# Patient Record
Sex: MALE | Employment: FULL TIME | URBAN - METROPOLITAN AREA
[De-identification: names, ages, dates, MRNs, and addresses within clinical notes are randomized per-mention and may not be internally consistent; named-entity substitution may affect disease eponyms.]

---

## 2024-07-17 PROBLEM — L40.50 PSORIATIC ARTHRITIS: Status: ACTIVE | Noted: 2024-07-17

## 2024-08-02 ENCOUNTER — OFFICE VISIT (OUTPATIENT)
Age: 54
End: 2024-08-02
Payer: COMMERCIAL

## 2024-08-02 ENCOUNTER — TELEPHONE (OUTPATIENT)
Age: 54
End: 2024-08-02

## 2024-08-02 VITALS
TEMPERATURE: 97.2 F | HEART RATE: 81 BPM | SYSTOLIC BLOOD PRESSURE: 138 MMHG | HEIGHT: 75 IN | WEIGHT: 315 LBS | BODY MASS INDEX: 39.17 KG/M2 | DIASTOLIC BLOOD PRESSURE: 80 MMHG

## 2024-08-02 DIAGNOSIS — Z79.899 HIGH RISK MEDICATION USE: ICD-10-CM

## 2024-08-02 DIAGNOSIS — D84.821 IMMUNOSUPPRESSION DUE TO DRUG THERAPY: ICD-10-CM

## 2024-08-02 DIAGNOSIS — L40.50 PSORIATIC ARTHRITIS OF MULTIPLE JOINTS: Primary | ICD-10-CM

## 2024-08-02 DIAGNOSIS — M17.12 PRIMARY OSTEOARTHRITIS OF LEFT KNEE: ICD-10-CM

## 2024-08-02 DIAGNOSIS — L40.9 PSORIASIS: ICD-10-CM

## 2024-08-02 DIAGNOSIS — Z79.1 NSAID LONG-TERM USE: ICD-10-CM

## 2024-08-02 DIAGNOSIS — Z79.899 IMMUNOSUPPRESSION DUE TO DRUG THERAPY: ICD-10-CM

## 2024-08-02 DIAGNOSIS — M54.50 LOW BACK PAIN, UNSPECIFIED BACK PAIN LATERALITY, UNSPECIFIED CHRONICITY, UNSPECIFIED WHETHER SCIATICA PRESENT: ICD-10-CM

## 2024-08-02 PROCEDURE — 99214 OFFICE O/P EST MOD 30 MIN: CPT | Performed by: INTERNAL MEDICINE

## 2024-08-02 RX ORDER — RISANKIZUMAB-RZAA 150 MG/ML
1 INJECTION SUBCUTANEOUS
Qty: 1 ML | Refills: 1 | Status: SHIPPED | OUTPATIENT
Start: 2024-08-02

## 2024-08-02 RX ORDER — CHOLECALCIFEROL (VITAMIN D3) 1250 MCG
CAPSULE ORAL
COMMUNITY
Start: 2024-07-29

## 2024-08-02 RX ORDER — NABUMETONE 750 MG/1
750 TABLET, FILM COATED ORAL 2 TIMES DAILY PRN
Qty: 60 TABLET | Refills: 5 | Status: SHIPPED | OUTPATIENT
Start: 2024-08-02

## 2024-08-02 RX ORDER — CEFADROXIL 500 MG/1
500 CAPSULE ORAL 2 TIMES DAILY
COMMUNITY

## 2024-08-02 RX ORDER — RISANKIZUMAB-RZAA 150 MG/ML
1 INJECTION SUBCUTANEOUS
COMMUNITY
Start: 2024-07-10 | End: 2024-08-02 | Stop reason: SDUPTHER

## 2024-08-02 RX ORDER — LEFLUNOMIDE 10 MG/1
10 TABLET ORAL DAILY
COMMUNITY
End: 2024-08-02

## 2024-08-02 NOTE — PROGRESS NOTES
Office Follow Up      Date: 08/02/2024   Patient Name: Eddie Pool  MRN: [id number]  YOB: 1970    Referring Physician: No ref. provider found     Chief Complaint:   Chief Complaint   Patient presents with    Psoriatic Arthritis    Follow-up       History of Present Illness: Eddie Pool is a 53 y.o. male who is here today for follow up on psoriatic arthritis with psoriasis    He reports initially being diagnosed with psoriatic arthritis with plaque psoriasis around 2019 by [facility].  He reports chronic pain and swelling in his finger joints, toes for the past few years.  He frequently has tenderness over his epicondyles elbows and tenderness of his lumbar spine.  He has had dactylitis/sausage toes in the past.  He has severe plaque psoriasis involving his scalp, extensor surface elbows, hands.      He has previously tried sulfasalazine which he was allergic to, methotrexate which caused hair loss and Enbrel for about 3 years.  He then switched to Otezla which did not help him at all.  Enbrel historically was well tolerated but was only partially beneficial for his psoriasis and arthritis.      Interim 3/19/24: Overall improvement in psoriasis and peripheral joint pain/swelling on Skyrizi.  He switched biologic from Enbrel to Skyrizi starting 12/26/23. Joint pain is less on the Skyrizi and psoriasis is markedly improved.  No significant side effects to Skyrizi.  No serious infection.  Still aching in the lumbar spine is the major problem today.      Subjective       Review of Systems: Review of Systems   Constitutional:  Positive for fatigue. Negative for chills, fever and unexpected weight loss.   HENT:  Negative for mouth sores, sinus pressure and sore throat.         Dry mouth  Nose sores  Jaw pain  Hearing loss-maybe       Eyes:  Positive for redness. Negative for pain.        Dry eyes   Respiratory:  Positive for shortness of breath. Negative for  cough.    Cardiovascular:  Negative for chest pain.        Leg cramping  edema   Gastrointestinal:  Positive for diarrhea. Negative for abdominal pain, blood in stool, nausea, vomiting and GERD.        Bloating     Endocrine: Negative for polydipsia and polyuria.   Genitourinary:  Negative for dysuria, genital sores and hematuria.   Musculoskeletal:  Positive for arthralgias, back pain, joint swelling, neck pain and neck stiffness.        Joint tenderness  Morning stiffness  Muscle cramping   Skin:  Negative for rash and bruise.        Psoriasis  Photosensitvity  Malar rash   Allergic/Immunologic: Negative for immunocompromised state.   Neurological:  Positive for weakness and headache. Negative for seizures, numbness and memory problem.   Hematological:  Negative for adenopathy. Does not bruise/bleed easily.   Psychiatric/Behavioral:  Positive for sleep disturbance. Negative for depressed mood. The patient is not nervous/anxious.         Medications:   Current Outpatient Medications:     betamethasone dipropionate (DIPROLENE) 0.05 % ointment, Apply 1 Application topically to the appropriate area as directed., Disp: , Rfl:     buPROPion XL (WELLBUTRIN XL) 300 MG 24 hr tablet, Take 1 tablet by mouth Daily., Disp: , Rfl:     busPIRone (BUSPAR) 10 MG tablet, Take 1 tablet by mouth 2 (Two) Times a Day., Disp: , Rfl:     calcipotriene-betamethasone (Taclonex) 0.005-0.064 % ointment, Apply 1 Application topically to the appropriate area as directed., Disp: , Rfl:     carvedilol (COREG) 25 MG tablet, Take 1 tablet by mouth Daily. WITH FOOD, Disp: , Rfl:     cefadroxil (DURICEF) 500 MG capsule, Take 1 capsule by mouth 2 (Two) Times a Day., Disp: , Rfl:     Cholecalciferol (Vitamin D3) 1.25 MG (42498 UT) capsule, , Disp: , Rfl:     folic acid (FOLVITE) 1 MG tablet, Take 1 tablet by mouth Daily., Disp: , Rfl:     loratadine (CLARITIN) 10 MG tablet, Take 1 tablet by mouth Daily., Disp: , Rfl:     metFORMIN (GLUCOPHAGE) 500 MG  "tablet, Take 1 tablet by mouth 2 (Two) Times a Day With Meals., Disp: , Rfl:     Skyrizi Pen 150 MG/ML solution auto-injector, Inject 1 mL under the skin into the appropriate area as directed Every 3 (Three) Months., Disp: 1 mL, Rfl: 1    traMADol (ULTRAM) 50 MG tablet, Take 1 tablet by mouth., Disp: , Rfl:     vitamin B-12 (CYANOCOBALAMIN) 1000 MCG tablet, Take 1 tablet by mouth Daily., Disp: , Rfl:     nabumetone (RELAFEN) 750 MG tablet, Take 1 tablet by mouth 2 (Two) Times a Day As Needed for Mild Pain., Disp: 60 tablet, Rfl: 5    Allergies:   Allergies   Allergen Reactions    Sulfa Antibiotics Hives and Swelling       I have reviewed and updated the patient's chief complaint, history of present illness, review of systems, past medical history, surgical history, family history, social history, medications and allergy list as appropriate.     Objective        Vital Signs:   Vitals:    08/02/24 1133   BP: 138/80   BP Location: Left arm   Patient Position: Sitting   Cuff Size: Adult   Pulse: 81   Temp: 97.2 °F (36.2 °C)   Weight: (!) 179 kg (394 lb 11.2 oz)   Height: 190.5 cm (75\")   PainSc:   8   PainLoc: Back     Body mass index is 49.33 kg/m².      Physical Exam:  Physical Exam   MUSCULOSKELETAL:   No peripheral synovitis. No dactylitis.  No tender peripheral joints.  No enthesitis  Tender lumbar spine  Tender range of motion left knee with some crepitus.  No warmth or effusion.  Positive Baker cyst left knee.    Complete joint exam was performed including the MCPs, PIPs, DIPs of the hands, wrists, elbows, shoulders, hips, knees and ankles.  No soft tissue swelling or tenderness is present except as above.    General: Obese.  Cooperative, alert and oriented. Affect is normal. Hydration appears normal.   HEENT: Normocephalic and atraumatic. No notable alopecia. Lids and conjunctiva are normal. Pupils are equal and sclera are clear. Oropharynx is clear   NECK neck is supple without adenopathy, masses or " "thyromegaly.   CARDIOVASCULAR: Regular rate and rhythm. No murmurs, rubs or gallops   LUNGS: Effort is normal. Lungs are clear bilateral   ABDOMEN: Not examined  EXTREMITIES: Peripheral pulses are intact. No clubbing.   SKIN: No plaque psoriasis. No rashes. No subcutaneous nodules. No digital ulcers. No sclerodactyly.   NEUROLOGIC: Gait is normal. Strength testing is normal.  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    Skyrizi well tolerated and has been beneficial for both his joints and his psoriasis.   No side effects.  No serious infection.  Major problem today is chronic lumbar pain that I think may be more degenerative.  He also has some degenerative arthritis left knee    -X-ray lumbar spine today; PT ordered  -Labs reviewed from July 2024 on patient phone are stable.  Request hardcopy labs from PCP  -Switch NSAID from meloxicam " to nabumetone  Recommend continue Skyrizi every 12 weeks   Return to clinic 3 months  Psoriasis  Dermatology Dr. Champion.    -Minimal psoriasis now and markedly improved on Skyrizi  High risk medication use  Skyrizi  Q TB - 11/28/2023  Hepatitis panel - 11/28/2023    Well tolerated and effective     We discussed biologic agents at length. Risks and alternatives were discussed at length and the option of no treatment was also given. We discussed risks including but not limited to infections which can be unusual, severe, and deadly. When possible, these agents should be stopped immediately if infections occur. Unusual infection such as TB and fungal infections can occur. There may be an increased risk of lymphoma with these agents. Other risks can include a multiple sclerosis-like illness and worsening of heart failure. Infusion or injection reactions which can be deadly have been reported. Studies on pregnancy have not been done so pregnancy should be avoided while on these agents. Reactivation of a deadly brain virus and hepatitis viruses have been reported. Worsening of COPD has been seen with orencia. Elevated lipids, elevation in liver functions, and dangerous changes in blood counts have been seen with certain agents. Regular monitoring will be required.  Immunosuppression due to drug therapy    Low back pain, unspecified back pain laterality, unspecified chronicity, unspecified whether sciatica present  -As needed NSAID  -Physical therapy ordered  -Weight loss and Mediterranean diet would help  Primary osteoarthritis of left knee    NSAID long-term use  Risks of NSAIDs discussed including GI upset, GI bleeding, renal and hepatic risks and the risks of cardiovascular disease and stroke. Warned patient not to take with other NSAIDs including OTC NSAIDs.    Orders Placed This Encounter   Procedures    XR Spine Lumbar 2 or 3 View    Ambulatory Referral to Physical Therapy     New Medications Ordered This Visit    Medications    Skyrizi Pen 150 MG/ML solution auto-injector     Sig: Inject 1 mL under the skin into the appropriate area as directed Every 3 (Three) Months.     Dispense:  1 mL     Refill:  1    nabumetone (RELAFEN) 750 MG tablet     Sig: Take 1 tablet by mouth 2 (Two) Times a Day As Needed for Mild Pain.     Dispense:  60 tablet     Refill:  5           Follow Up:   Return in about 4 months (around 12/2/2024).        Shaji Singh MD  [facility]

## 2024-08-02 NOTE — ASSESSMENT & PLAN NOTE
Jez STARKS TB - 11/28/2023  Hepatitis panel - 11/28/2023    Well tolerated and effective     We discussed biologic agents at length. Risks and alternatives were discussed at length and the option of no treatment was also given. We discussed risks including but not limited to infections which can be unusual, severe, and deadly. When possible, these agents should be stopped immediately if infections occur. Unusual infection such as TB and fungal infections can occur. There may be an increased risk of lymphoma with these agents. Other risks can include a multiple sclerosis-like illness and worsening of heart failure. Infusion or injection reactions which can be deadly have been reported. Studies on pregnancy have not been done so pregnancy should be avoided while on these agents. Reactivation of a deadly brain virus and hepatitis viruses have been reported. Worsening of COPD has been seen with orencia. Elevated lipids, elevation in liver functions, and dangerous changes in blood counts have been seen with certain agents. Regular monitoring will be required.

## 2024-08-02 NOTE — ASSESSMENT & PLAN NOTE
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    Skyrizi well tolerated and has been beneficial for both his joints and his psoriasis.   No side effects.  No serious infection.  Major problem today is chronic lumbar pain that I think may be more degenerative.  He also has some degenerative arthritis left knee    -X-ray lumbar spine today; PT ordered  -Labs reviewed from July 2024 on patient phone are stable.  Request hardcopy labs from PCP  -Switch NSAID from meloxicam to nabumetone  Recommend continue Skyrizi every 12 weeks   Return to clinic 3 months

## 2024-08-07 ENCOUNTER — SPECIALTY PHARMACY (OUTPATIENT)
Age: 54
End: 2024-08-07
Payer: COMMERCIAL

## 2024-11-20 ENCOUNTER — TELEPHONE (OUTPATIENT)
Age: 54
End: 2024-11-20
Payer: COMMERCIAL

## 2024-11-20 ENCOUNTER — SPECIALTY PHARMACY (OUTPATIENT)
Age: 54
End: 2024-11-20
Payer: COMMERCIAL

## 2024-11-20 NOTE — TELEPHONE ENCOUNTER
File Link    Scan on 11/20/2024 0510 by [employer], [place]: [employer],SKYRIZI 150 MG/ML        Key Information    Document ID File Type Document Type Description   [id number] Image REFERRAL/PRE-AUTH CSN - SCAN [employer],SKYRIZI 150 MG/ML     Import Information    Attached At Date Time User Dept   Encounter Level 11/20/2024  5:10 AM [employer], [place]      Encounter    Scanned Document on 11/20/24 with [employer], [place]

## 2025-01-17 ENCOUNTER — OFFICE VISIT (OUTPATIENT)
Age: 55
End: 2025-01-17
Payer: COMMERCIAL

## 2025-01-17 ENCOUNTER — TELEPHONE (OUTPATIENT)
Age: 55
End: 2025-01-17

## 2025-01-17 VITALS
HEART RATE: 99 BPM | SYSTOLIC BLOOD PRESSURE: 130 MMHG | DIASTOLIC BLOOD PRESSURE: 78 MMHG | WEIGHT: 315 LBS | BODY MASS INDEX: 39.17 KG/M2 | TEMPERATURE: 97.3 F | HEIGHT: 75 IN

## 2025-01-17 DIAGNOSIS — L40.50 PSORIATIC ARTHRITIS OF MULTIPLE JOINTS: Primary | ICD-10-CM

## 2025-01-17 DIAGNOSIS — Z79.899 HIGH RISK MEDICATION USE: ICD-10-CM

## 2025-01-17 NOTE — PROGRESS NOTES
Office Follow Up      Date: 01/17/2025   Patient Name: Eddie Pool  MRN: [id number]  YOB: 1970    Referring Physician: Diego Herrera, *     Chief Complaint:   Chief Complaint   Patient presents with    Psoriatic Arthritis       History of Present Illness: Eddie Pool is a 54 y.o. male who is here today for follow up on psoriatic arthritis with psoriasis    History:  He reports initially being diagnosed with psoriatic arthritis with plaque psoriasis around 2019 by [facility].  He reports chronic pain and swelling in his finger joints, toes for the past few years.  He frequently has tenderness over his epicondyles elbows and tenderness of his lumbar spine.  He has had dactylitis/sausage toes in the past.  He has severe plaque psoriasis involving his scalp, extensor surface elbows, hands.      He has previously tried sulfasalazine which he was allergic to, methotrexate which caused hair loss and Enbrel for about 3 years.  He then switched to Otezla which did not help him at all.  Enbrel historically was well tolerated but was only partially beneficial for his psoriasis and arthritis.      Interim 1/17/2025: Overall improvement in psoriasis and peripheral joint pain/swelling on Skyrizi.  He switched biologic from Enbrel to Skyrizi starting 12/26/23. Joint pain is less on the Skyrizi and psoriasis is markedly improved.  No significant side effects to Skyrizi.  No serious infection.  His insurance switched the first of the year and he thinks Skyrizi may not be covered with a new insurance      Subjective       Review of Systems: Review of Systems   Constitutional:  Negative for chills, fatigue, fever and unexpected weight loss.   HENT:  Positive for congestion and sinus pressure. Negative for mouth sores and sore throat.    Eyes:  Negative for pain and redness.   Respiratory:  Positive for cough. Negative for shortness of breath.    Cardiovascular:   Negative for chest pain.   Gastrointestinal:  Positive for diarrhea, GERD and indigestion. Negative for abdominal pain, blood in stool, nausea and vomiting.   Endocrine: Negative for polydipsia and polyuria.   Genitourinary:  Positive for flank pain. Negative for dysuria, genital sores and hematuria.   Musculoskeletal:  Positive for arthralgias, back pain, joint swelling, myalgias and neck pain. Negative for neck stiffness.   Skin:  Positive for dry skin. Negative for rash and bruise.   Allergic/Immunologic: Negative for immunocompromised state.   Neurological:  Negative for seizures, weakness, numbness and memory problem.   Hematological:  Negative for adenopathy. Does not bruise/bleed easily.   Psychiatric/Behavioral:  Positive for sleep disturbance. Negative for depressed mood. The patient is not nervous/anxious.         Medications:   Current Outpatient Medications:     buPROPion XL (WELLBUTRIN XL) 300 MG 24 hr tablet, Take 1 tablet by mouth Daily., Disp: , Rfl:     busPIRone (BUSPAR) 10 MG tablet, Take 1 tablet by mouth 2 (Two) Times a Day., Disp: , Rfl:     carvedilol (COREG) 25 MG tablet, Take 1 tablet by mouth Daily. WITH FOOD, Disp: , Rfl:     Cholecalciferol (Vitamin D3) 1.25 MG (13951 UT) capsule, , Disp: , Rfl:     folic acid (FOLVITE) 1 MG tablet, Take 1 tablet by mouth Daily., Disp: , Rfl:     loratadine (CLARITIN) 10 MG tablet, Take 1 tablet by mouth Daily., Disp: , Rfl:     metFORMIN (GLUCOPHAGE) 500 MG tablet, Take 1 tablet by mouth 2 (Two) Times a Day With Meals., Disp: , Rfl:     nabumetone (RELAFEN) 750 MG tablet, Take 1 tablet by mouth 2 (Two) Times a Day As Needed for Mild Pain., Disp: 60 tablet, Rfl: 5    Skyrizi Pen 150 MG/ML solution auto-injector, Inject 1 mL under the skin into the appropriate area as directed Every 3 (Three) Months., Disp: 1 mL, Rfl: 1    traMADol (ULTRAM) 50 MG tablet, Take 1 tablet by mouth., Disp: , Rfl:     vitamin B-12 (CYANOCOBALAMIN) 1000 MCG tablet, Take 1 tablet  "by mouth Daily., Disp: , Rfl:     Allergies:   Allergies   Allergen Reactions    Sulfa Antibiotics Hives and Swelling       I have reviewed and updated the patient's chief complaint, history of present illness, review of systems, past medical history, surgical history, family history, social history, medications and allergy list, physical exam, assessment and plan as appropriate.     Objective        Vital Signs:   Vitals:    01/17/25 0851   BP: 130/78   BP Location: Right arm   Patient Position: Sitting   Cuff Size: Large Adult   Pulse: 99   Temp: 97.3 °F (36.3 °C)   Weight: (!) 185 kg (408 lb 3.2 oz)   Height: 190.5 cm (75\")   PainSc:   7       Body mass index is 51.02 kg/m².      Physical Exam:  Physical Exam   MUSCULOSKELETAL:   No peripheral synovitis. No dactylitis.  No tender peripheral joints.  No enthesitis  Tender lumbar spine  Tender range of motion left knee with some crepitus.  No warmth or effusion.  Positive Baker cyst left knee.    Complete joint exam was performed including the MCPs, PIPs, DIPs of the hands, wrists, elbows, shoulders, hips, knees and ankles.  No soft tissue swelling or tenderness is present except as above.    General: Obese.  Cooperative, alert and oriented. Affect is normal. Hydration appears normal.   HEENT: Normocephalic and atraumatic. No notable alopecia. Lids and conjunctiva are normal. Pupils are equal and sclera are clear. Oropharynx is clear   NECK neck is supple without adenopathy, masses or thyromegaly.   CARDIOVASCULAR: Regular rate and rhythm. No murmurs, rubs or gallops   LUNGS: Effort is normal. Lungs are clear bilateral   ABDOMEN: Not examined  EXTREMITIES: Peripheral pulses are intact. No clubbing.   SKIN: No plaque psoriasis. No rashes. No subcutaneous nodules. No digital ulcers. No sclerodactyly.   NEUROLOGIC: Gait is normal. Strength testing is normal.  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  Swollen joint count 0.  Skyrizi well tolerated and has been beneficial for both his joints and his psoriasis.   No side effects.  No serious infection.  Some chronic lumbar pain likely due to degenerative arthritis.  He also has some degenerative arthritis left knee     -Labs reviewed from July 2024 are stable.  Request hardcopy more recent labs done with PCP  -Labs ordered as below including QuantiFERON for monitoring on biologic therapy  -Continue as needed nabumetone  -continue Skyrizi every 12 weeks and we will try to get this approved with his new insurance.  He is due for his next dose March 2025  Return to clinic 6 months    Psoriasis  Dermatology Dr. Champion.     -Minimal psoriasis and markedly improved on Skyrizi    High risk medication use  Skyrizi  Q TB - 11/28/2023 updated 1/17/2025  Hepatitis panel - 11/28/2023     Well tolerated and effective      We discussed biologic agents at length. Risks and alternatives were discussed at length and the option of no treatment was also given. We discussed risks including but not limited to " infections which can be unusual, severe, and deadly. When possible, these agents should be stopped immediately if infections occur. Unusual infection such as TB and fungal infections can occur. There may be an increased risk of lymphoma with these agents. Other risks can include a multiple sclerosis-like illness and worsening of heart failure. Infusion or injection reactions which can be deadly have been reported. Studies on pregnancy have not been done so pregnancy should be avoided while on these agents. Reactivation of a deadly brain virus and hepatitis viruses have been reported. Worsening of COPD has been seen with orencia. Elevated lipids, elevation in liver functions, and dangerous changes in blood counts have been seen with certain agents. Regular monitoring will be required.    Immunosuppression due to drug therapy      Low back pain  -As needed NSAID  -Physical therapy ordered  -Weight loss and Mediterranean diet would help    Primary osteoarthritis of left knee     NSAID long-term use  Risks of NSAIDs discussed including GI upset, GI bleeding, renal and hepatic risks and the risks of cardiovascular disease and stroke. Warned patient not to take with other NSAIDs including OTC NSAIDs    Assessment & Plan  Psoriatic arthritis of multiple joints    High risk medication use      Orders Placed This Encounter   Procedures    CBC Auto Differential    Comprehensive Metabolic Panel    C-reactive Protein    Sedimentation Rate    QuantiFERON-TB Gold Plus       New Medications Ordered This Visit   Medications    nabumetone (RELAFEN) 750 MG tablet     Sig: Take 1 tablet by mouth 2 (Two) Times a Day As Needed for Mild Pain.     Dispense:  60 tablet     Refill:  5             Follow Up:   Return in about 6 months (around 7/17/2025).        Shaji Singh MD  [facility] [place] [place]

## 2025-01-17 NOTE — TELEPHONE ENCOUNTER
I spoke with April at PCP's office.  Pt hasn't had labs since July 2024, but she will go ahead and fax those to our office. -Destinee Baca, SUMAYA

## 2025-01-17 NOTE — TELEPHONE ENCOUNTER
Pt with new insurance 1/25 and reports he thinks Skyrizi not covered with new insurnace.    Prior auth continued Skyrizi for psoriatic arthritis as it works well for his disease.  Pt has failed multiple biologic previously.  See my progress note for details

## 2025-02-24 DIAGNOSIS — L40.50 PSORIATIC ARTHRITIS OF MULTIPLE JOINTS: ICD-10-CM

## 2025-02-24 DIAGNOSIS — L40.9 PSORIASIS: ICD-10-CM

## 2025-02-26 RX ORDER — RISANKIZUMAB-RZAA 150 MG/ML
INJECTION SUBCUTANEOUS
Qty: 1 ML | Refills: 1 | OUTPATIENT
Start: 2025-02-26

## 2025-03-10 ENCOUNTER — SPECIALTY PHARMACY (OUTPATIENT)
Facility: HOSPITAL | Age: 55
End: 2025-03-10
Payer: COMMERCIAL

## 2025-03-14 ENCOUNTER — SPECIALTY PHARMACY (OUTPATIENT)
Age: 55
End: 2025-03-14
Payer: COMMERCIAL

## 2025-04-24 ENCOUNTER — TELEPHONE (OUTPATIENT)
Age: 55
End: 2025-04-24
Payer: COMMERCIAL

## 2025-07-14 ENCOUNTER — TELEPHONE (OUTPATIENT)
Age: 55
End: 2025-07-14
Payer: COMMERCIAL

## 2025-07-14 NOTE — TELEPHONE ENCOUNTER
Spoke to wife... [facility].. told her I would have Stephanie call her 07/15/25

## 2025-07-18 ENCOUNTER — OFFICE VISIT (OUTPATIENT)
Age: 55
End: 2025-07-18
Payer: COMMERCIAL

## 2025-07-18 ENCOUNTER — TELEPHONE (OUTPATIENT)
Age: 55
End: 2025-07-18

## 2025-07-18 VITALS
DIASTOLIC BLOOD PRESSURE: 84 MMHG | TEMPERATURE: 97.3 F | SYSTOLIC BLOOD PRESSURE: 124 MMHG | WEIGHT: 315 LBS | BODY MASS INDEX: 39.17 KG/M2 | HEIGHT: 75 IN | HEART RATE: 99 BPM

## 2025-07-18 DIAGNOSIS — L40.50 PSORIATIC ARTHRITIS OF MULTIPLE JOINTS: Primary | ICD-10-CM

## 2025-07-18 DIAGNOSIS — Z79.899 IMMUNOSUPPRESSION DUE TO DRUG THERAPY: ICD-10-CM

## 2025-07-18 DIAGNOSIS — L40.9 PSORIASIS: ICD-10-CM

## 2025-07-18 DIAGNOSIS — Z79.899 HIGH RISK MEDICATION USE: ICD-10-CM

## 2025-07-18 DIAGNOSIS — D84.821 IMMUNOSUPPRESSION DUE TO DRUG THERAPY: ICD-10-CM

## 2025-07-18 PROCEDURE — 99214 OFFICE O/P EST MOD 30 MIN: CPT | Performed by: INTERNAL MEDICINE

## 2025-07-18 RX ORDER — RISANKIZUMAB-RZAA 150 MG/ML
1 INJECTION SUBCUTANEOUS
Qty: 1 ML | Refills: 1 | Status: SHIPPED | OUTPATIENT
Start: 2025-07-18

## 2025-07-18 NOTE — TELEPHONE ENCOUNTER
According to notes January 2025 from pharmacist, his new insurance does not cover any biologic medication.  Is this indeed the case?  Please continue to work on appealing Skyrizi to his insurance to see if they might possibly get it covered.  He reports he was told he did not qualify for [employer] assistance for Skyrizi.  He has been out of Skyrizi since January 2025 when he got his new insurance.    I gave him 2 samples of Skyrizi today to last for the next 6 months while we work on this.  Thank you

## 2025-07-18 NOTE — TELEPHONE ENCOUNTER
We submitted an appeal with the patients insurance company at the end of May, it can take up to 60 days to process, we are waiting on a determination.     [employer] is denying patient assistance for the patient until the appeal is completed.

## 2025-07-18 NOTE — PROGRESS NOTES
Office Follow Up      Date: 07/18/2025   Patient Name: Eddie Pool  MRN: [id number]  YOB: 1970    Referring Physician: No ref. provider found     Chief Complaint:   Chief Complaint   Patient presents with    Psoriatic Arthritis       History of Present Illness: Eddie Pool is a 54 y.o. male who is here today for follow up on psoriatic arthritis with psoriasis     History:  He reports initially being diagnosed with psoriatic arthritis with plaque psoriasis around 2019 by [facility] rheumatology.  He reports chronic pain and swelling in his finger joints, toes for the past few years.  He frequently has tenderness over his epicondyles elbows and tenderness of his lumbar spine.  He has had dactylitis/sausage toes in the past.  He has severe plaque psoriasis involving his scalp, extensor surface elbows, hands.       He has previously tried sulfasalazine which he was allergic to, methotrexate which caused hair loss and Enbrel for about 3 years.  He then switched to Otezla which did not help him at all.  Enbrel historically was well tolerated but was only partially beneficial for his psoriasis and arthritis.      He switch from Enbrel to Skyrizi starting 12/26/2023.     Interim 7/18/2025: Overall improvement in psoriasis and peripheral joint pain/swelling on Skyrizi. Joint pain is less on the Skyrizi and psoriasis is improved.  No significant side effects to Skyrizi.  No serious infection.      Unfortunately he has been out of Skyrizi since January 2025.  His new insurance reportedly does not cover Skyrizi and he reports he does not qualify for [employer] assistance for Skyrizi.  Skyrizi was really working well for him and he would like to stay on it.  He notes increasing back pain and recurrence of psoriasis in his scalp and behind the ears    History of Present Illness        Subjective       Review of Systems: Review of Systems   Constitutional:  Positive for activity  change and fatigue. Negative for chills, fever and unexpected weight loss.   HENT:  Negative for mouth sores, sinus pressure and sore throat.    Eyes:  Negative for pain and redness.   Respiratory:  Negative for cough and shortness of breath.    Cardiovascular:  Positive for leg swelling. Negative for chest pain.   Gastrointestinal:  Positive for anal bleeding, blood in stool and indigestion. Negative for abdominal pain, diarrhea, nausea, vomiting and GERD.   Endocrine: Negative for polydipsia and polyuria.   Genitourinary:  Negative for dysuria, genital sores and hematuria.   Musculoskeletal:  Positive for arthralgias, back pain, joint swelling and neck pain. Negative for myalgias and neck stiffness.   Skin:  Negative for rash and bruise.   Neurological:  Negative for seizures, weakness, numbness and memory problem.   Hematological:  Negative for adenopathy. Does not bruise/bleed easily.   Psychiatric/Behavioral:  Negative for depressed mood. The patient is not nervous/anxious.         Past Medical History:   Past Medical History:   Diagnosis Date    Condition not found     HEART IS ON THE RIGHT SIDE    GERD (gastroesophageal reflux disease)     Headache, migraine     Hypertension     Obesity     Psoriasis     Psoriatic arthritis        Past Surgical History:   Past Surgical History:   Procedure Laterality Date    CHOLECYSTECTOMY         Family History:   Family History   Problem Relation Age of Onset    Heart disease Mother     Heart attack Mother     Fibromyalgia Mother     Lung cancer Father     Fibromyalgia Sister     Arthritis Sister     Crohn's disease Sister     Diabetes Brother     Post-traumatic stress disorder Daughter        Social History:   Social History     Socioeconomic History    Marital status:    Tobacco Use    Smoking status: Never   Vaping Use    Vaping status: Never Used   Substance and Sexual Activity    Alcohol use: Never     Comment: NO    Drug use: Never    Sexual activity: Defer  "      Medications:   Current Outpatient Medications:     buPROPion XL (WELLBUTRIN XL) 300 MG 24 hr tablet, Take 1 tablet by mouth Daily., Disp: , Rfl:     busPIRone (BUSPAR) 10 MG tablet, Take 1 tablet by mouth 2 (Two) Times a Day., Disp: , Rfl:     carvedilol (COREG) 25 MG tablet, Take 1 tablet by mouth Daily. WITH FOOD, Disp: , Rfl:     Cholecalciferol (Vitamin D3) 1.25 MG (72071 UT) capsule, , Disp: , Rfl:     loratadine (CLARITIN) 10 MG tablet, Take 1 tablet by mouth Daily., Disp: , Rfl:     metFORMIN (GLUCOPHAGE) 500 MG tablet, Take 1 tablet by mouth 2 (Two) Times a Day With Meals., Disp: , Rfl:     nabumetone (RELAFEN) 750 MG tablet, Take 1 tablet by mouth 2 (Two) Times a Day As Needed for Mild Pain., Disp: 60 tablet, Rfl: 5    Skyrizi Pen 150 MG/ML solution auto-injector, Inject 1 mL under the skin into the appropriate area as directed Every 3 (Three) Months., Disp: 1 mL, Rfl: 1    traMADol (ULTRAM) 50 MG tablet, Take 1 tablet by mouth., Disp: , Rfl:     vitamin B-12 (CYANOCOBALAMIN) 1000 MCG tablet, Take 1 tablet by mouth Daily., Disp: , Rfl:     Allergies:   Allergies   Allergen Reactions    Sulfa Antibiotics Hives and Swelling           Objective        Vital Signs:   Vitals:    07/18/25 0954   BP: 124/84   BP Location: Left arm   Patient Position: Sitting   Cuff Size: Large Adult   Pulse: 99   Temp: 97.3 °F (36.3 °C)   Weight: (!) 176 kg (388 lb 9.6 oz)   Height: 190.5 cm (75\")   PainSc: 8      Body mass index is 48.57 kg/m².      Physical Exam:  Physical Exam   MUSCULOSKELETAL:   No peripheral synovitis  No dactylitis.  No tender peripheral joints.  No enthesitis  Tender lumbar spine  Tender range of motion left knee with some crepitus.  No warmth or effusion.     Complete joint exam was performed including the MCPs, PIPs, DIPs of the hands, wrists, elbows, shoulders, hips, knees and ankles.  No soft tissue swelling or tenderness is present except as above.    General: The patient is well-developed and " "well nourished. Cooperative, alert and oriented. Affect is normal. Hydration appears normal.   HEENT: Normocephalic and atraumatic. Lids and conjunctiva are normal. Pupils are equal and sclera are clear. Oropharynx is clear   NECK neck is supple without adenopathy, masses or thyromegaly.   CARDIOVASCULAR: Regular rate and rhythm. No murmurs, rubs or gallops   LUNGS: Effort is normal. Lungs are clear bilateral   ABDOMEN: Not examined  EXTREMITIES: Peripheral pulses are intact. No clubbing.   SKIN: Positive psoriasiform plaques behind the ears and hairline.  No plaques extremities.    No subcutaneous nodules. No digital ulcers. No sclerodactyly.   NEUROLOGIC: Gait is normal. Strength testing is normal.  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  Swollen joint count 0.  Skyrizi well tolerated and has been beneficial for both his joints and his psoriasis.   No side effects.  No serious infection.  Some chronic lumbar pain likely due to degenerative arthritis.  He also has " some degenerative arthritis left knee     -Unfortunately he has been out of Skyrizi since January 2025.  His new insurance reportedly does not cover Skyrizi or any biologic therapy, and he reports he does not qualify for [employer] assistance for Skyrizi.  Skyrizi was really working well for him and he would like to stay on it.    He notes increasing back pain and recurrence of psoriasis in his scalp and behind the ears without the Skyrizi    -2 Skyrizi 150 mg samples provided today from our sample closet last for the next 6 months  -Continue to appeal the denial of Skyrizi with his insurance.  -Labs ordered as below including QuantiFERON for monitoring on biologic therapy  -Continue as needed nabumetone.  Refilled  Return to clinic 6 months     Psoriasis  Dermatology Dr. Champion.     -Minimal psoriasis and markedly improved on Skyrizi     High risk medication use  Immunosuppression due to drug therapy  Skyrizi  Q TB - 11/28/2023 updated 7/18/2025  Hepatitis panel - 11/28/2023     Well tolerated and effective      We discussed biologic agents at length. Risks and alternatives were discussed at length and the option of no treatment was also given. We discussed risks including but not limited to infections which can be unusual, severe, and deadly. When possible, these agents should be stopped immediately if infections occur. Unusual infection such as TB and fungal infections can occur. There may be an increased risk of lymphoma with these agents. Other risks can include a multiple sclerosis-like illness and worsening of heart failure. Infusion or injection reactions which can be deadly have been reported. Studies on pregnancy have not been done so pregnancy should be avoided while on these agents. Reactivation of a deadly brain virus and hepatitis viruses have been reported. Worsening of COPD has been seen with orencia. Elevated lipids, elevation in liver functions, and dangerous changes in blood counts have been seen with  certain agents. Regular monitoring will be required.        Low back pain  Primary osteoarthritis of left knee  -As needed NSAID  -Physical therapy ordered  -Weight loss and Mediterranean diet would help        NSAID long-term use  Risks of NSAIDs discussed including GI upset, GI bleeding, renal and hepatic risks and the risks of cardiovascular disease and stroke. Warned patient not to take with other NSAIDs including OTC NSAIDs      1. Psoriatic arthritis of multiple joints    2. High risk medication use    3. Immunosuppression due to drug therapy    4. Psoriasis        Assessment & Plan        Orders Placed This Encounter   Procedures    CBC Auto Differential    Comprehensive Metabolic Panel    C-reactive Protein    Sedimentation Rate    QuantiFERON-TB Gold Plus (Li-Hep)     New Medications Ordered This Visit   Medications    nabumetone (RELAFEN) 750 MG tablet     Sig: Take 1 tablet by mouth 2 (Two) Times a Day As Needed for Mild Pain.     Dispense:  60 tablet     Refill:  5    Skyrizi Pen 150 MG/ML solution auto-injector     Sig: Inject 1 mL under the skin into the appropriate area as directed Every 3 (Three) Months.     Dispense:  1 mL     Refill:  1       Follow Up:   Return in about 6 months (around 1/18/2026).      Discussed plan of care in detail with the patient and his wife today.  Patient verbalized understanding and agrees.    I confirm accuracy of unchanged data/findings which have been carried forward from previous visit.  I have updated appropriately those that have changed.        Shaji Singh MD  [facility] [place]

## 2025-07-21 ENCOUNTER — SPECIALTY PHARMACY (OUTPATIENT)
Age: 55
End: 2025-07-21
Payer: COMMERCIAL

## 2025-07-22 LAB
ALBUMIN SERPL-MCNC: 4.4 G/DL (ref 3.5–5.2)
ALBUMIN/GLOB SERPL: 1.6 G/DL
ALP SERPL-CCNC: 75 U/L (ref 39–117)
ALT SERPL-CCNC: 42 U/L (ref 1–41)
AST SERPL-CCNC: 27 U/L (ref 1–40)
BASOPHILS # BLD AUTO: 0.04 10*3/MM3 (ref 0–0.2)
BASOPHILS NFR BLD AUTO: 0.4 % (ref 0–1.5)
BILIRUB SERPL-MCNC: 0.5 MG/DL (ref 0–1.2)
BUN SERPL-MCNC: 13 MG/DL (ref 6–20)
BUN/CREAT SERPL: 12.5 (ref 7–25)
CALCIUM SERPL-MCNC: 9.3 MG/DL (ref 8.6–10.5)
CHLORIDE SERPL-SCNC: 103 MMOL/L (ref 98–107)
CO2 SERPL-SCNC: 27.7 MMOL/L (ref 22–29)
CREAT SERPL-MCNC: 1.04 MG/DL (ref 0.76–1.27)
CRP SERPL-MCNC: 0.49 MG/DL (ref 0–0.5)
EGFRCR SERPLBLD CKD-EPI 2021: 85.3 ML/MIN/1.73
EOSINOPHIL # BLD AUTO: 0.19 10*3/MM3 (ref 0–0.4)
EOSINOPHIL NFR BLD AUTO: 2 % (ref 0.3–6.2)
ERYTHROCYTE [DISTWIDTH] IN BLOOD BY AUTOMATED COUNT: 13 % (ref 12.3–15.4)
ERYTHROCYTE [SEDIMENTATION RATE] IN BLOOD BY WESTERGREN METHOD: 11 MM/HR (ref 0–20)
GAMMA INTERFERON BACKGROUND BLD IA-ACNC: 0.04 IU/ML
GLOBULIN SER CALC-MCNC: 2.8 GM/DL
GLUCOSE SERPL-MCNC: 82 MG/DL (ref 65–99)
HCT VFR BLD AUTO: 46.1 % (ref 37.5–51)
HGB BLD-MCNC: 15.4 G/DL (ref 13–17.7)
IMM GRANULOCYTES # BLD AUTO: 0.06 10*3/MM3 (ref 0–0.05)
IMM GRANULOCYTES NFR BLD AUTO: 0.6 % (ref 0–0.5)
LYMPHOCYTES # BLD AUTO: 2.47 10*3/MM3 (ref 0.7–3.1)
LYMPHOCYTES NFR BLD AUTO: 25.8 % (ref 19.6–45.3)
M TB IFN-G BLD-IMP: NEGATIVE
M TB IFN-G CD4+ BCKGRND COR BLD-ACNC: 0.05 IU/ML
M TB IFN-G CD4+CD8+ BCKGRND COR BLD-ACNC: 0.05 IU/ML
MCH RBC QN AUTO: 30.6 PG (ref 26.6–33)
MCHC RBC AUTO-ENTMCNC: 33.4 G/DL (ref 31.5–35.7)
MCV RBC AUTO: 91.7 FL (ref 79–97)
MITOGEN IGNF BCKGRD COR BLD-ACNC: >10 IU/ML
MONOCYTES # BLD AUTO: 0.78 10*3/MM3 (ref 0.1–0.9)
MONOCYTES NFR BLD AUTO: 8.1 % (ref 5–12)
NEUTROPHILS # BLD AUTO: 6.04 10*3/MM3 (ref 1.7–7)
NEUTROPHILS NFR BLD AUTO: 63.1 % (ref 42.7–76)
NRBC BLD AUTO-RTO: 0 /100 WBC (ref 0–0.2)
PLATELET # BLD AUTO: 282 10*3/MM3 (ref 140–450)
POTASSIUM SERPL-SCNC: 5.1 MMOL/L (ref 3.5–5.2)
PROT SERPL-MCNC: 7.2 G/DL (ref 6–8.5)
QUANTIFERON INCUBATION: NORMAL
RBC # BLD AUTO: 5.03 10*6/MM3 (ref 4.14–5.8)
SERVICE CMNT-IMP: NORMAL
SODIUM SERPL-SCNC: 141 MMOL/L (ref 136–145)
WBC # BLD AUTO: 9.58 10*3/MM3 (ref 3.4–10.8)